# Patient Record
Sex: MALE | Race: WHITE | HISPANIC OR LATINO | URBAN - METROPOLITAN AREA
[De-identification: names, ages, dates, MRNs, and addresses within clinical notes are randomized per-mention and may not be internally consistent; named-entity substitution may affect disease eponyms.]

---

## 2017-02-14 ENCOUNTER — EMERGENCY (EMERGENCY)
Facility: HOSPITAL | Age: 42
LOS: 0 days | Discharge: HOME | End: 2017-02-14
Attending: EMERGENCY MEDICINE
Payer: MEDICAID

## 2017-02-14 VITALS
HEIGHT: 65 IN | RESPIRATION RATE: 17 BRPM | WEIGHT: 145.06 LBS | OXYGEN SATURATION: 100 % | SYSTOLIC BLOOD PRESSURE: 144 MMHG | HEART RATE: 83 BPM | TEMPERATURE: 99 F | DIASTOLIC BLOOD PRESSURE: 97 MMHG

## 2017-02-14 VITALS
SYSTOLIC BLOOD PRESSURE: 140 MMHG | TEMPERATURE: 99 F | HEART RATE: 71 BPM | OXYGEN SATURATION: 98 % | DIASTOLIC BLOOD PRESSURE: 98 MMHG | RESPIRATION RATE: 16 BRPM

## 2017-02-14 DIAGNOSIS — M20.001 UNSPECIFIED DEFORMITY OF RIGHT FINGER(S): ICD-10-CM

## 2017-02-14 DIAGNOSIS — S60.450A SUPERFICIAL FOREIGN BODY OF RIGHT INDEX FINGER, INITIAL ENCOUNTER: ICD-10-CM

## 2017-02-14 PROCEDURE — 99284 EMERGENCY DEPT VISIT MOD MDM: CPT

## 2017-02-14 PROCEDURE — 76881 US COMPL JOINT R-T W/IMG: CPT | Mod: 26,RT

## 2017-02-14 NOTE — ED ADULT NURSE NOTE - OBJECTIVE STATEMENT
Patient complaining of right index finger pain. Finger noted to be swollen at this time. Patient states the finger has been swelling up more since the past week.

## 2017-02-14 NOTE — ED ADULT TRIAGE NOTE - CHIEF COMPLAINT QUOTE
pt states , " A spring stuck me in my finger , it painful and swollen " pt with right index finger swollen , no drainage

## 2017-11-03 NOTE — ED PROVIDER NOTE - ENMT, MLM
Warm
Airway patent, Nasal mucosa clear. Mouth with normal mucosa. Throat has no vesicles, no oropharyngeal exudates and uvula is midline.

## 2018-03-10 NOTE — ED PROVIDER NOTE - EYES, MLM
Clear bilaterally, pupils equal, round and reactive to light.
Breath sounds clear and equal bilaterally.

## 2021-12-17 NOTE — ED ADULT TRIAGE NOTE - ACCOMPANIED BY
Immediate family member Implemented All Universal Safety Interventions:  [place] to call system. Call bell, personal items and telephone within reach. Instruct patient to call for assistance. Room bathroom lighting operational. Non-slip footwear when patient is off stretcher. Physically safe environment: no spills, clutter or unnecessary equipment. Stretcher in lowest position, wheels locked, appropriate side rails in place.

## 2024-06-16 NOTE — ED PROVIDER NOTE - CHPI ED SYMPTOMS POS
Pharmacokinetic Assessment Follow Up: IV Vancomycin    Vancomycin serum concentration assessment(s):    The trough level was drawn correctly and can be used to guide therapy at this time. The measurement is above the desired definitive target range of 15 to 20 mcg/mL.    Vancomycin Regimen Plan:  Vancomycin Trough = 22.5  Hold Vancomycin   Vancomycin Trough 6-17-24 at 1130AM  Dosing will follow after result.  Patient's Scr increased.    Drug levels (last 3 results):  Recent Labs   Lab Result Units 06/15/24  1125 06/16/24  1156   Vancomycin Random ug/ml 9.6  --    Vancomycin Trough ug/ml  --  22.2*       Pharmacy will continue to follow and monitor vancomycin.    Please contact pharmacy at extension [phone] for questions regarding this assessment.    Thank you for the consult,   Tam Courtney       Patient brief summary:  Reid Mcqueen is a 71 y.o. male initiated on antimicrobial therapy with IV Vancomycin for treatment of skin & soft tissue infection      Drug Allergies:   Review of patient's allergies indicates:   Allergen Reactions    Cardizem [diltiazem hcl] Other (See Comments)       Renal Function:   Estimated Creatinine Clearance: 32.6 mL/min (A) (based on SCr of 2.6 mg/dL (H)).,       CBC (last 72 hours):  Recent Labs   Lab Result Units 06/14/24  1555 06/15/24  0715 06/16/24  0430   WBC x10(3)/mcL 26.25* 21.52* 16.30*   Hgb g/dL 15.3 12.7* 12.0*   Hct % 45.1 38.3 36.6   Platelet x10(3)/mcL 197 158 153   Mono % %  --  4.2* 8.0   Eos % %  --  0.0* 0.0*   Basophil % %  --  0.1 0.2       Metabolic Panel (last 72 hours):  Recent Labs   Lab Result Units 06/14/24  1555 06/14/24  1733 06/15/24  0430 06/16/24  0432   Sodium mmol/L 137  --  133* 133*   Potassium mmol/L 3.6  --  3.9 3.4*   Chloride mmol/L 101  --  103 102   CO2 mmol/L 24  --  24 24   Glucose mg/dL 166*  --  154* 137*   Glucose, UA   --  Negative  --   --    Blood Urea Nitrogen mg/dL 34*  --  28* 34*   Creatinine mg/dL 1.69*  --  1.43* 2.60*   Albumin  g/dL 4.5  --  3.3* 3.3*   Bilirubin Total mg/dL 1.2*  --  1.5* 1.1*   ALP unit/L 69  --  61 67   AST unit/L 35  --  32 30   ALT unit/L 35  --  24 24   Magnesium Level mg/dL 1.80  --   --  2.40       Microbiologic Results:  Microbiology Results (last 7 days)       Procedure Component Value Units Date/Time    Blood Culture x two cultures. Draw prior to antibiotics [[id number]] Collected: 06/14/24 1756    Order Status: Completed Specimen: Blood Updated: 06/15/24 1900     Blood Culture No Growth At 24 Hours    Blood Culture x two cultures. Draw prior to antibiotics [[id number]] Collected: 06/14/24 1756    Order Status: Completed Specimen: Blood Updated: 06/15/24 1900     Blood Culture No Growth At 24 Hours            DEFORMITY

## 2025-07-06 NOTE — ED ADULT TRIAGE NOTE - AS TEMP SITE
Consult has been called to Dr. Andrade on 7/6/25. Spoke with Francois. 3:26 PM    Fani Dominguez  7/6/2025   oral